# Patient Record
Sex: FEMALE | Race: WHITE | Employment: OTHER | URBAN - METROPOLITAN AREA
[De-identification: names, ages, dates, MRNs, and addresses within clinical notes are randomized per-mention and may not be internally consistent; named-entity substitution may affect disease eponyms.]

---

## 2018-03-12 ENCOUNTER — OFFICE VISIT (OUTPATIENT)
Dept: INTERNAL MEDICINE CLINIC | Age: 83
End: 2018-03-12

## 2018-03-12 VITALS
BODY MASS INDEX: 28.41 KG/M2 | OXYGEN SATURATION: 97 % | TEMPERATURE: 98.3 F | WEIGHT: 166.4 LBS | HEIGHT: 64 IN | SYSTOLIC BLOOD PRESSURE: 140 MMHG | DIASTOLIC BLOOD PRESSURE: 70 MMHG | HEART RATE: 95 BPM

## 2018-03-12 DIAGNOSIS — J40 BRONCHITIS: Primary | ICD-10-CM

## 2018-03-12 PROCEDURE — 4040F PNEUMOC VAC/ADMIN/RCVD: CPT | Performed by: NURSE PRACTITIONER

## 2018-03-12 PROCEDURE — 1090F PRES/ABSN URINE INCON ASSESS: CPT | Performed by: NURSE PRACTITIONER

## 2018-03-12 PROCEDURE — 1036F TOBACCO NON-USER: CPT | Performed by: NURSE PRACTITIONER

## 2018-03-12 PROCEDURE — G8484 FLU IMMUNIZE NO ADMIN: HCPCS | Performed by: NURSE PRACTITIONER

## 2018-03-12 PROCEDURE — G8419 CALC BMI OUT NRM PARAM NOF/U: HCPCS | Performed by: NURSE PRACTITIONER

## 2018-03-12 PROCEDURE — G8427 DOCREV CUR MEDS BY ELIG CLIN: HCPCS | Performed by: NURSE PRACTITIONER

## 2018-03-12 PROCEDURE — 99203 OFFICE O/P NEW LOW 30 MIN: CPT | Performed by: NURSE PRACTITIONER

## 2018-03-12 PROCEDURE — 1123F ACP DISCUSS/DSCN MKR DOCD: CPT | Performed by: NURSE PRACTITIONER

## 2018-03-12 RX ORDER — GUAIFENESIN 600 MG/1
600 TABLET, EXTENDED RELEASE ORAL 2 TIMES DAILY
Qty: 30 TABLET | Refills: 0 | Status: SHIPPED | OUTPATIENT
Start: 2018-03-12

## 2018-03-12 RX ORDER — BENZONATATE 100 MG/1
100 CAPSULE ORAL 3 TIMES DAILY PRN
Qty: 30 CAPSULE | Refills: 0 | Status: SHIPPED | OUTPATIENT
Start: 2018-03-12 | End: 2018-03-19

## 2018-03-12 ASSESSMENT — ENCOUNTER SYMPTOMS
SINUS PAIN: 0
SINUS PRESSURE: 0
SORE THROAT: 0
COUGH: 1

## 2018-03-12 ASSESSMENT — PATIENT HEALTH QUESTIONNAIRE - PHQ9
SUM OF ALL RESPONSES TO PHQ9 QUESTIONS 1 & 2: 0
1. LITTLE INTEREST OR PLEASURE IN DOING THINGS: 0
SUM OF ALL RESPONSES TO PHQ QUESTIONS 1-9: 0
2. FEELING DOWN, DEPRESSED OR HOPELESS: 0

## 2018-03-12 NOTE — PROGRESS NOTES
Department of Internal Medicine  Clinic Note    Date: 3/12/2018                                               Subjective/Objective:     Chief Complaint   Patient presents with    Cough     pt. thinks it is bronchitis, x2       HPI (location/radiation, quality, severity)    Cough x 2-3 days, productive of yellow mucus. No sob, no cp, no fever or chills. No headache or myalgias. Taking ASA daily and using cough drops. Not on any regular medications. She's been seeing Dr. Dina Denver, oncologist for everything for the last 10 years. She plans on scheduling today with one of our docs to establish care. Tobacco:  reports that she has quit smoking. Her smoking use included Cigarettes. She smoked 1.00 pack per day. She has never used smokeless tobacco.  ETOH:  reports that she does not drink alcohol. Current Outpatient Prescriptions   Medication Sig Dispense Refill    guaiFENesin (MUCINEX) 600 MG extended release tablet Take 1 tablet by mouth 2 times daily 30 tablet 0    benzonatate (TESSALON PERLES) 100 MG capsule Take 1 capsule by mouth 3 times daily as needed for Cough 30 capsule 0     No current facility-administered medications for this visit. No Known Allergies    Review of Systems   Constitutional: Negative for chills, fatigue and fever. HENT: Positive for congestion and postnasal drip. Negative for sinus pain, sinus pressure and sore throat. Respiratory: Positive for cough. Genitourinary: Negative for dysuria. Musculoskeletal: Negative for myalgias. Skin: Negative for rash. Neurological: Negative for dizziness, weakness, light-headedness, numbness and headaches. All other systems reviewed and are negative.       Vitals:  BP (!) 140/70 (Site: Left Arm, Position: Sitting, Cuff Size: Small Adult)   Pulse 95   Temp 98.3 °F (36.8 °C) (Oral)   Ht 5' 4\" (1.626 m)   Wt 166 lb 6.4 oz (75.5 kg)   SpO2 97%   BMI 28.56 kg/m²     Physical Exam   Constitutional: She is oriented to

## 2018-03-12 NOTE — PATIENT INSTRUCTIONS
Patient Education     Drink plenty of water. Use mucinex twice a day. Use cough medication as needed. Call in 2 days and let us know how you are doing. Bronchitis: Care Instructions  Your Care Instructions    Bronchitis is inflammation of the bronchial tubes, which carry air to the lungs. The tubes swell and produce mucus, or phlegm. The mucus and inflamed bronchial tubes make you cough. You may have trouble breathing. Most cases of bronchitis are caused by viruses like those that cause colds. Antibiotics usually do not help and they may be harmful. Bronchitis usually develops rapidly and lasts about 2 to 3 weeks in otherwise healthy people. Follow-up care is a key part of your treatment and safety. Be sure to make and go to all appointments, and call your doctor if you are having problems. It's also a good idea to know your test results and keep a list of the medicines you take. How can you care for yourself at home? · Take all medicines exactly as prescribed. Call your doctor if you think you are having a problem with your medicine. · Get some extra rest.  · Take an over-the-counter pain medicine, such as acetaminophen (Tylenol), ibuprofen (Advil, Motrin), or naproxen (Aleve) to reduce fever and relieve body aches. Read and follow all instructions on the label. · Do not take two or more pain medicines at the same time unless the doctor told you to. Many pain medicines have acetaminophen, which is Tylenol. Too much acetaminophen (Tylenol) can be harmful. · Take an over-the-counter cough medicine that contains dextromethorphan to help quiet a dry, hacking cough so that you can sleep. Avoid cough medicines that have more than one active ingredient. Read and follow all instructions on the label. · Breathe moist air from a humidifier, hot shower, or sink filled with hot water. The heat and moisture will thin mucus so you can cough it out. · Do not smoke. Smoking can make bronchitis worse.  If you need

## 2018-03-14 ENCOUNTER — TELEPHONE (OUTPATIENT)
Dept: INTERNAL MEDICINE CLINIC | Age: 83
End: 2018-03-14

## 2018-03-15 ENCOUNTER — TELEPHONE (OUTPATIENT)
Dept: INTERNAL MEDICINE CLINIC | Age: 83
End: 2018-03-15

## 2018-03-15 NOTE — TELEPHONE ENCOUNTER
ÁLVAROI    Scheduled ov for 3/16 with Evaline Riling NP      Symptoms worsens advise to been seen in  ER.

## 2018-03-16 ENCOUNTER — OFFICE VISIT (OUTPATIENT)
Dept: INTERNAL MEDICINE CLINIC | Age: 83
End: 2018-03-16

## 2018-03-16 ENCOUNTER — HOSPITAL ENCOUNTER (OUTPATIENT)
Dept: NON INVASIVE DIAGNOSTICS | Age: 83
Discharge: HOME | End: 2018-03-16
Attending: NURSE PRACTITIONER | Admitting: NURSE PRACTITIONER

## 2018-03-16 VITALS
BODY MASS INDEX: 28.07 KG/M2 | TEMPERATURE: 98.3 F | SYSTOLIC BLOOD PRESSURE: 138 MMHG | DIASTOLIC BLOOD PRESSURE: 68 MMHG | HEART RATE: 95 BPM | OXYGEN SATURATION: 98 % | WEIGHT: 164.4 LBS | HEIGHT: 64 IN

## 2018-03-16 DIAGNOSIS — R05.9 COUGH: Primary | ICD-10-CM

## 2018-03-16 DIAGNOSIS — R05.9 COUGH: ICD-10-CM

## 2018-03-16 DIAGNOSIS — R09.81 SINUS CONGESTION: ICD-10-CM

## 2018-03-16 PROCEDURE — G8427 DOCREV CUR MEDS BY ELIG CLIN: HCPCS | Performed by: NURSE PRACTITIONER

## 2018-03-16 PROCEDURE — G8484 FLU IMMUNIZE NO ADMIN: HCPCS | Performed by: NURSE PRACTITIONER

## 2018-03-16 PROCEDURE — 4040F PNEUMOC VAC/ADMIN/RCVD: CPT | Performed by: NURSE PRACTITIONER

## 2018-03-16 PROCEDURE — 1123F ACP DISCUSS/DSCN MKR DOCD: CPT | Performed by: NURSE PRACTITIONER

## 2018-03-16 PROCEDURE — 99213 OFFICE O/P EST LOW 20 MIN: CPT | Performed by: NURSE PRACTITIONER

## 2018-03-16 PROCEDURE — 1090F PRES/ABSN URINE INCON ASSESS: CPT | Performed by: NURSE PRACTITIONER

## 2018-03-16 PROCEDURE — 1036F TOBACCO NON-USER: CPT | Performed by: NURSE PRACTITIONER

## 2018-03-16 PROCEDURE — G8419 CALC BMI OUT NRM PARAM NOF/U: HCPCS | Performed by: NURSE PRACTITIONER

## 2018-03-16 RX ORDER — AZITHROMYCIN 250 MG/1
TABLET, FILM COATED ORAL
Qty: 1 PACKET | Refills: 0 | Status: SHIPPED | OUTPATIENT
Start: 2018-03-16 | End: 2018-03-26

## 2018-03-16 RX ORDER — FLUTICASONE PROPIONATE 50 MCG
2 SPRAY, SUSPENSION (ML) NASAL DAILY
Qty: 1 BOTTLE | Refills: 0 | Status: SHIPPED | OUTPATIENT
Start: 2018-03-16 | End: 2019-03-16

## 2018-03-16 ASSESSMENT — ENCOUNTER SYMPTOMS
NAUSEA: 0
COUGH: 1
ABDOMINAL PAIN: 0
DIARRHEA: 0
SHORTNESS OF BREATH: 0
VOMITING: 0

## 2018-03-16 NOTE — PROGRESS NOTES
Department of Internal Medicine  Clinic Note    Date: 3/16/2018                                               Subjective/Objective:     Chief Complaint   Patient presents with    Cough     x7    Fever       HPI (location/radiation, quality, severity)    Pt was seen here 4 days ago with cough and congestion. At that time I told her start tessalon and mucinex with supportive measures. She called with an update 2 days ago and felt much better. Then yesterday started to feel worse again. Cough- yellow mucus, feels congestion in in her upper chest, postnasal drainage. A subjective low grade fever at night last night. Also taking aleve and it helped. Tobacco:  reports that she has quit smoking. Her smoking use included Cigarettes. She smoked 1.00 pack per day. She has never used smokeless tobacco.  ETOH:  reports that she does not drink alcohol. Current Outpatient Prescriptions   Medication Sig Dispense Refill    azithromycin (ZITHROMAX) 250 MG tablet Take 2 tabs (500 mg) on Day 1, and take 1 tab (250 mg) on days 2 through 5. 1 packet 0    fluticasone (FLONASE) 50 MCG/ACT nasal spray 2 sprays by Nasal route daily 1 Bottle 0    guaiFENesin (MUCINEX) 600 MG extended release tablet Take 1 tablet by mouth 2 times daily 30 tablet 0    benzonatate (TESSALON PERLES) 100 MG capsule Take 1 capsule by mouth 3 times daily as needed for Cough 30 capsule 0     No current facility-administered medications for this visit. No Known Allergies    Review of Systems   Constitutional: Positive for fever. Negative for chills and fatigue. HENT: Positive for congestion and postnasal drip. Respiratory: Positive for cough. Negative for shortness of breath. Cardiovascular: Negative for chest pain. Gastrointestinal: Negative for abdominal pain, diarrhea, nausea and vomiting. Genitourinary: Negative for dysuria. Musculoskeletal: Negative for myalgias. Skin: Negative for rash.    Neurological: Negative for

## 2018-03-16 NOTE — PATIENT INSTRUCTIONS
notice more mucus or a change in the color of your mucus. ? · You have new symptoms, such as a sore throat, an earache, or sinus pain. ? · You do not get better as expected. Where can you learn more? Go to [email or web address] and sign in to your [employer] account. Enter D279 in the [employer] box to learn more about \"Cough: Care Instructions. \"     If you do not have an account, please click on the \"Sign Up Now\" link. Current as of: May 12, 2017  Content Version: 11.5  © 3674-7228 [employer]. Care instructions adapted under license by [facility] ([facility]). If you have questions about a medical condition or this instruction, always ask your healthcare professional. [address] any warranty or liability for your use of this information.

## 2018-03-20 ENCOUNTER — TELEPHONE (OUTPATIENT)
Dept: INTERNAL MEDICINE CLINIC | Age: 83
End: 2018-03-20

## 2018-03-20 DIAGNOSIS — R30.0 DYSURIA: Primary | ICD-10-CM

## 2018-03-20 RX ORDER — SULFAMETHOXAZOLE AND TRIMETHOPRIM 800; 160 MG/1; MG/1
1 TABLET ORAL 2 TIMES DAILY
Qty: 6 TABLET | Refills: 0 | Status: SHIPPED | OUTPATIENT
Start: 2018-03-20 | End: 2018-03-23

## 2018-03-20 NOTE — TELEPHONE ENCOUNTER
Patient calling seeing if Julieta Triplett NP can prescribe antiobics for UTI. Symptoms started last night. Has taken OTC medication AZO 1 dose . Feels a little better from taking the tablet at 4am    Urine is orange from taking the tablets. No abdominal pain, No fever, No back pain. Patient recently seen by Julieta Triplett NP.       Please advise: [phone]      Pharmacy preference:     [address] [place] [address] [phone]